# Patient Record
Sex: MALE | Race: OTHER | Employment: UNEMPLOYED | URBAN - METROPOLITAN AREA
[De-identification: names, ages, dates, MRNs, and addresses within clinical notes are randomized per-mention and may not be internally consistent; named-entity substitution may affect disease eponyms.]

---
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
## 2022-02-09 ENCOUNTER — OFFICE VISIT (OUTPATIENT)
Dept: INTERNAL MEDICINE CLINIC | Age: 22
End: 2022-02-09
Payer: COMMERCIAL

## 2022-02-09 VITALS
DIASTOLIC BLOOD PRESSURE: 59 MMHG | TEMPERATURE: 98.6 F | BODY MASS INDEX: 25.75 KG/M2 | RESPIRATION RATE: 18 BRPM | HEIGHT: 67 IN | WEIGHT: 164.1 LBS | SYSTOLIC BLOOD PRESSURE: 105 MMHG | OXYGEN SATURATION: 98 % | HEART RATE: 75 BPM

## 2022-02-09 DIAGNOSIS — Z76.89 ENCOUNTER TO ESTABLISH CARE: Primary | ICD-10-CM

## 2022-02-09 PROCEDURE — 99204 OFFICE O/P NEW MOD 45 MIN: CPT | Performed by: INTERNAL MEDICINE

## 2022-02-09 NOTE — PROGRESS NOTES
1. Have you been to the ER, urgent care clinic since your last visit? Hospitalized since your last visit? No    2. Have you seen or consulted any other health care providers outside of the [address] [place] [address] since your last visit? Include any pap smears or colon screening.  No

## 2022-02-09 NOTE — PROGRESS NOTES
SPORTS MEDICINE [facility] PRIMARY [facility]ARE  Jones Alba MD, [address] [address]  Phone:  [phone]  Fax: [phone]    Chief Complaint   Patient presents with    Establish Care       SUBJECTIVE:    Marcos Gonsalez is a 25 y.o. male Patient comes in today and is seen as a new patient to establish care. This visit establishes care. He denies new complaints and is seen for evaluation.             Past Medical History:   Diagnosis Date    Asthma     age 11mos, not currently;     Encounter to establish care     History of recurrent ear infection     Unspecified adverse effect of anesthesia     \"bad headache after last surgery\"     Past Surgical History:   Procedure Laterality Date    [place] MYRINGOPLASTY  2012     Allergies   Allergen Reactions    Bactrim [Sulfamethoprim Ds] Rash and Angioedema    Bactrim [Sulfamethoprim Ds] Rash    Cefdinir Angioedema       REVIEW OF SYSTEMS:  General: negative for - chills or fever  ENT: negative for - headaches, nasal congestion or tinnitus  Respiratory: negative for - cough, hemoptysis, shortness of breath or wheezing  Cardiovascular : negative for - chest pain, edema, palpitations or shortness of breath  Gastrointestinal: negative for - abdominal pain, blood in stools, heartburn or nausea/vomiting  Genito-Urinary: no dysuria, trouble voiding, or hematuria  Musculoskeletal: negative for - gait disturbance, joint pain, joint stiffness or joint swelling  Neurological: no TIA or stroke symptoms  Hematologic: no bruises, no bleeding, no swollen glands  Integument: no lumps, mole changes, nail changes or rash  Endocrine:no malaise/lethargy or unexpected weight changes      Social History     Socioeconomic History    Marital status: SINGLE   Tobacco Use    Smoking status: Never Smoker    Smokeless tobacco: Never Used   Vaping Use    Vaping Use: Never used   Substance and Sexual Activity    Alcohol use: No     Alcohol/week: 0.0 standard drinks  Drug use: No    Sexual activity: Yes     Partners: Female     Birth control/protection: Condom     History reviewed. No pertinent family history. Habits:  He is a lifetime nonsmoker, non drinker, non drug abuser. Social History:  The patient is single, lives with his parents. 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 He has no children. Gnosticist preference is Mandaeism. Family History:  His parents and brother and sister were born in [facility]. His father is 48, mother is 46, alive and well. One brother and one sister are alive and well. OBJECTIVE:     Visit Vitals  BP (!) 105/59   Pulse 75   Temp 98.6 °F (37 °C) (Oral)   Resp 18   Ht 5' 7\" (1.702 m)   Wt 164 lb 1.6 oz (74.4 kg)   SpO2 98%   BMI 25.70 kg/m²     CONSTITUTIONAL: well , well nourished, appears age appropriate  EYES: perrla, eom intact  ENMT:moist mucous membranes, pharynx clear  NECK: supple. Thyroid normal  RESPIRATORY: Chest: clear bilaterally  CARDIOVASCULAR: Heart: regular rate and rhythm  GASTROINTESTINAL: Abdomen: soft, bowel sounds active  HEMATOLOGIC: no pathological lymph nodes palpated  MUSCULOSKELETAL: Extremities: no edema, pulse 1+   INTEGUMENT: No unusual rashes or suspicious skin lesions noted. Nails appear normal.  NEUROLOGIC: non-focal exam   MENTAL STATUS: alert and oriented, appropriate affect     No visits with results within 3 Month(s) from this visit. Latest known visit with results is:   Office Visit on 02/08/2018   Component Date Value Ref Range Status    VALID INTERNAL CONTROL POC 02/08/2018 Yes   Final    Group A Strep Ag 02/08/2018 Positive  Negative Final       ASSESSMENT:   1. Encounter to establish care      Patient states he saw an orthopedic doctor and periodically gets a sharp pain in his knee and was given therapy, but only went for one day. He would like some more therapy.   He is going to send me a MyChart note with his diagnosis and I will give him a therapy referral.    His medical status is otherwise stable. He is going to check to see if he has had his HPV and if not, he will come by and get that one day. Otherwise, he will see us in a year or prn. We encouraged him to complete school. I have discussed the diagnosis with the patient and the intended plan as seen in the  orders above. The patient understands and agees with the plan. The patient has   received an after visit summary and questions were answered concerning  future plans  Patient labs and/or xrays were reviewed  Past records were reviewed. PLAN:  .  Orders Placed This Encounter    HEPATITIS C AB    URINALYSIS W/ RFLX MICROSCOPIC    CBC WITH AUTOMATED DIFF    METABOLIC PANEL, COMPREHENSIVE       Follow-up and Dispositions    · Return in about 1 year (around 2/9/2023), or if symptoms worsen or fail to improve. ATTENTION:   This medical record was transcribed using an electronic medical records system. Although proofread, it may and can contain electronic and spelling errors. Other human spelling and other errors may be present. Corrections may be executed at a later time. Please feel free to contact us for any clarifications as needed.

## 2022-02-10 LAB
ALBUMIN SERPL-MCNC: 4.1 G/DL (ref 3.5–5)
ALBUMIN/GLOB SERPL: 1.2 {RATIO} (ref 1.1–2.2)
ALP SERPL-CCNC: 109 U/L (ref 45–117)
ALT SERPL-CCNC: 45 U/L (ref 12–78)
ANION GAP SERPL CALC-SCNC: 2 MMOL/L (ref 5–15)
APPEARANCE UR: ABNORMAL
AST SERPL-CCNC: 21 U/L (ref 15–37)
BASOPHILS # BLD: 0 K/UL (ref 0–0.1)
BASOPHILS NFR BLD: 0 % (ref 0–1)
BILIRUB SERPL-MCNC: 0.5 MG/DL (ref 0.2–1)
BILIRUB UR QL: NEGATIVE
BUN SERPL-MCNC: 13 MG/DL (ref 6–20)
BUN/CREAT SERPL: 15 (ref 12–20)
CALCIUM SERPL-MCNC: 9.3 MG/DL (ref 8.5–10.1)
CHLORIDE SERPL-SCNC: 106 MMOL/L (ref 97–108)
CO2 SERPL-SCNC: 29 MMOL/L (ref 21–32)
COLOR UR: ABNORMAL
CREAT SERPL-MCNC: 0.84 MG/DL (ref 0.7–1.3)
DIFFERENTIAL METHOD BLD: NORMAL
EOSINOPHIL # BLD: 0.1 K/UL (ref 0–0.4)
EOSINOPHIL NFR BLD: 2 % (ref 0–7)
ERYTHROCYTE [DISTWIDTH] IN BLOOD BY AUTOMATED COUNT: 12.5 % (ref 11.5–14.5)
GLOBULIN SER CALC-MCNC: 3.5 G/DL (ref 2–4)
GLUCOSE SERPL-MCNC: 89 MG/DL (ref 65–100)
GLUCOSE UR STRIP.AUTO-MCNC: NEGATIVE MG/DL
HCT VFR BLD AUTO: 49.6 % (ref 36.6–50.3)
HCV AB SERPL QL IA: NONREACTIVE
HGB BLD-MCNC: 16.5 G/DL (ref 12.1–17)
HGB UR QL STRIP: NEGATIVE
IMM GRANULOCYTES # BLD AUTO: 0 K/UL (ref 0–0.04)
IMM GRANULOCYTES NFR BLD AUTO: 0 % (ref 0–0.5)
KETONES UR QL STRIP.AUTO: NEGATIVE MG/DL
LEUKOCYTE ESTERASE UR QL STRIP.AUTO: NEGATIVE
LYMPHOCYTES # BLD: 2.5 K/UL (ref 0.8–3.5)
LYMPHOCYTES NFR BLD: 36 % (ref 12–49)
MCH RBC QN AUTO: 29.7 PG (ref 26–34)
MCHC RBC AUTO-ENTMCNC: 33.3 G/DL (ref 30–36.5)
MCV RBC AUTO: 89.2 FL (ref 80–99)
MONOCYTES # BLD: 0.5 K/UL (ref 0–1)
MONOCYTES NFR BLD: 7 % (ref 5–13)
NEUTS SEG # BLD: 3.7 K/UL (ref 1.8–8)
NEUTS SEG NFR BLD: 55 % (ref 32–75)
NITRITE UR QL STRIP.AUTO: NEGATIVE
NRBC # BLD: 0 K/UL (ref 0–0.01)
NRBC BLD-RTO: 0 PER 100 WBC
PH UR STRIP: 7.5 [PH] (ref 5–8)
PLATELET # BLD AUTO: 216 K/UL (ref 150–400)
PMV BLD AUTO: 11.3 FL (ref 8.9–12.9)
POTASSIUM SERPL-SCNC: 4.5 MMOL/L (ref 3.5–5.1)
PROT SERPL-MCNC: 7.6 G/DL (ref 6.4–8.2)
PROT UR STRIP-MCNC: NEGATIVE MG/DL
RBC # BLD AUTO: 5.56 M/UL (ref 4.1–5.7)
SODIUM SERPL-SCNC: 137 MMOL/L (ref 136–145)
SP GR UR REFRACTOMETRY: 1.02 (ref 1–1.03)
UROBILINOGEN UR QL STRIP.AUTO: 1 EU/DL (ref 0.2–1)
WBC # BLD AUTO: 6.9 K/UL (ref 4.1–11.1)

## 2022-07-08 ENCOUNTER — OFFICE VISIT (OUTPATIENT)
Dept: PRIMARY CARE CLINIC | Age: 22
End: 2022-07-08
Payer: COMMERCIAL

## 2022-07-08 VITALS
OXYGEN SATURATION: 99 % | HEART RATE: 74 BPM | TEMPERATURE: 97.4 F | SYSTOLIC BLOOD PRESSURE: 121 MMHG | BODY MASS INDEX: 26.24 KG/M2 | HEIGHT: 67 IN | DIASTOLIC BLOOD PRESSURE: 73 MMHG | WEIGHT: 167.2 LBS | RESPIRATION RATE: 20 BRPM

## 2022-07-08 DIAGNOSIS — G89.29 CHRONIC PAIN OF BOTH KNEES: Primary | ICD-10-CM

## 2022-07-08 DIAGNOSIS — M25.562 CHRONIC PAIN OF BOTH KNEES: Primary | ICD-10-CM

## 2022-07-08 DIAGNOSIS — M25.561 CHRONIC PAIN OF BOTH KNEES: Primary | ICD-10-CM

## 2022-07-08 PROCEDURE — 99202 OFFICE O/P NEW SF 15 MIN: CPT | Performed by: FAMILY MEDICINE

## 2022-07-08 NOTE — PROGRESS NOTES
1. \"Have you been to the ER, urgent care clinic since your last visit? Hospitalized since your last visit? \" No    2. \"Have you seen or consulted any other health care providers outside of the [address] [place] [address] since your last visit? \" No     3. For patients aged 39-70: Has the patient had a colonoscopy / FIT/ Cologuard? NA - based on age      If the patient is female:    4. For patients aged 41-77: Has the patient had a mammogram within the past 2 years? NA - based on age or sex      11. For patients aged 21-65: Has the patient had a pap smear? NA - based on age or sex  Visit Vitals  /73 (BP 1 Location: Left upper arm, BP Patient Position: Sitting, BP Cuff Size: Adult)   Pulse 74   Temp 97.4 °F (36.3 °C) (Temporal)   Resp 20   Ht 5' 7\" (1.702 m)   Wt 167 lb 3.2 oz (75.8 kg)   SpO2 99%   BMI 26.19 kg/m²     Chief Complaint   Patient presents with    Knee Pain     Both knee pain/discomfort.  No injuries

## 2022-07-08 NOTE — PROGRESS NOTES
HPI     Chief Complaint:   Chief Complaint   Patient presents with    Knee Pain     Both knee pain/discomfort. No injuries         Nikos Sims is an 25 y.o. male who presents to establish care. No current PCP. Medical history significant for:   Patient Active Problem List   Diagnosis Code    History of recurrent ear infection Z86.69    Encounter to establish care Z76.89       Concerns for today:     Patient has a several year history of chronic knee pain. He previously had XRays which he say were unrevealing and underwent PT. The pain occurs intermittently, and feels below the knee cap, sharp in nature. There is no associated swelling. He is active with sports and exercise. He has recently been working out more and with that has noticed increasing discomfort. He has not tried anything for the pain. Current medications include:   No current outpatient medications on file. No current facility-administered medications for this visit. Allergies - reviewed: Allergies   Allergen Reactions    Bactrim [Sulfamethoprim Ds] Rash and Angioedema    Bactrim [Sulfamethoprim Ds] Rash    Cefdinir Angioedema         Past Medical History - reviewed:  Past Medical History:   Diagnosis Date    Asthma     age 11mos, not currently;     Encounter to establish care     History of recurrent ear infection     Unspecified adverse effect of anesthesia     \"bad headache after last surgery\"       Social History - reviewed:  Social History     Tobacco Use    Smoking status: Never Smoker    Smokeless tobacco: Never Used   Vaping Use    Vaping Use: Never used   Substance Use Topics    Alcohol use: No     Alcohol/week: 0.0 standard drinks    Drug use: No       Past Surgical History - reviewed:  Past Surgical History:   Procedure Laterality Date    [place] MYRINGOPLASTY  2012       Family History - reviewed:  History reviewed. No pertinent family history.       Immunizations - reviewed:   Immunization History   Administered Date(s) Administered    COVID-19, J&J, (age 18y+), IM, 0.5mL 05/05/2021    COVID-19, PFIZER PURPLE top, DILUTE for use, (age 15 y+), IM, 30mcg/0.3mL 04/05/2021, 05/05/2021, 12/22/2021    DTaP 2002, 01/23/2003, 03/28/2003, 01/16/2004, 02/19/2007    HPV (9-valent) 01/28/2016    Hep A Vaccine 11/29/2011, 07/30/2013    Hep B Vaccine 2002, 01/23/2003, 03/28/2003    Hib 2002, 01/23/2003, 03/28/2003, 01/16/2004    Influenza Vaccine 10/31/2008, 12/18/2021    Influenza Vaccine (Quad) PF (>6 Mo Flulaval, Fluarix, and >3 Yrs Afluria, Fluzone 09294) 01/28/2016    MMR 10/21/2003, 02/19/2007    Meningococcal ACWY Vaccine 01/22/2015    Pneumococcal Vaccine (Unspecified Type) 2002, 01/23/2003, 03/28/2003, 01/24/2004    Poliovirus vaccine 2002, 01/23/2003, 10/21/2003, 02/19/2007    Tdap 08/20/2013    Varicella Virus Vaccine 10/21/2003, 02/19/2007       Review of Systems   Review of Systems   Constitutional: Negative for chills and fever. Musculoskeletal: Positive for joint pain. Negative for back pain and falls. Skin: Negative for itching and rash. Neurological: Negative for tingling and weakness. Objective     Visit Vitals  /73 (BP 1 Location: Left upper arm, BP Patient Position: Sitting, BP Cuff Size: Adult)   Pulse 74   Temp 97.4 °F (36.3 °C) (Temporal)   Resp 20   Ht 5' 7\" (1.702 m)   Wt 167 lb 3.2 oz (75.8 kg)   SpO2 99%   BMI 26.19 kg/m²       Physical Exam  Vitals and nursing note reviewed. Constitutional:       General: He is not in acute distress. Cardiovascular:      Rate and Rhythm: Normal rate and regular rhythm. Pulmonary:      Effort: Pulmonary effort is normal. No respiratory distress. Breath sounds: Normal breath sounds. Musculoskeletal:      Comments: FROM both knees and both hips. No laxity with valgus or varus stress of either leg. Anterior drawer test negative bilaterally.  No crepitus of either knee or ballotable effusion. Neurological:      Mental Status: He is alert. Assessment and Plan     Last Xrays were several years ago, will repeat today. Patient is to avoid lower extremity weights and running for 2 weeks to see if symptoms approve. NSAIDs prn pain/inflammation. Diagnoses and all orders for this visit:    1. Chronic pain of both knees  -     XR KNEE RT MIN 4 V; Future  -     XR KNEE LT MIN 4 V; Future             I discussed the aforementioned diagnoses with the patient as well as the plan of care. All questions were answered and an AVS was provided.      Iris Farah MD  [facility]  [place] [address] [place] [address], [address] [place] [address]

## 2022-07-22 ENCOUNTER — TELEPHONE (OUTPATIENT)
Dept: PRIMARY CARE CLINIC | Age: 22
End: 2022-07-22

## 2022-07-22 NOTE — TELEPHONE ENCOUNTER
Pt says he did PT 4 years ago , he called where he went previously they ssid he would need a new referral for it, also he said he asked about getting a letter for his gym where he has a contract with them stating he should refrain for gym activity until he referred to PT and then they evaluate.

## 2022-10-24 ENCOUNTER — TELEPHONE (OUTPATIENT)
Dept: PRIMARY CARE CLINIC | Age: 22
End: 2022-10-24

## 2022-10-24 NOTE — TELEPHONE ENCOUNTER
Pt came in office asking about a referral he was supposed to receive for PT. He is also asking for a note for his gym that he cannot go back. Mentioned you wrote him a note before and since then he has had no issues.

## 2022-10-25 DIAGNOSIS — M25.562 CHRONIC PAIN OF BOTH KNEES: Primary | ICD-10-CM

## 2022-10-25 DIAGNOSIS — M25.561 CHRONIC PAIN OF BOTH KNEES: Primary | ICD-10-CM

## 2022-10-25 DIAGNOSIS — G89.29 CHRONIC PAIN OF BOTH KNEES: Primary | ICD-10-CM

## 2022-11-28 ENCOUNTER — TELEPHONE (OUTPATIENT)
Dept: PRIMARY CARE CLINIC | Age: 22
End: 2022-11-28

## 2023-01-16 ENCOUNTER — HOSPITAL ENCOUNTER (EMERGENCY)
Age: 23
Discharge: STILL ADMITTED OR ADMINISTRATIVE | End: 2023-01-16
Attending: STUDENT IN AN ORGANIZED HEALTH CARE EDUCATION/TRAINING PROGRAM